# Patient Record
Sex: FEMALE | Race: WHITE | NOT HISPANIC OR LATINO | URBAN - METROPOLITAN AREA
[De-identification: names, ages, dates, MRNs, and addresses within clinical notes are randomized per-mention and may not be internally consistent; named-entity substitution may affect disease eponyms.]

---

## 2017-08-10 ENCOUNTER — IMPORTED ENCOUNTER (OUTPATIENT)
Dept: URBAN - METROPOLITAN AREA CLINIC 50 | Facility: CLINIC | Age: 82
End: 2017-08-10

## 2017-11-20 ENCOUNTER — IMPORTED ENCOUNTER (OUTPATIENT)
Dept: URBAN - METROPOLITAN AREA CLINIC 50 | Facility: CLINIC | Age: 82
End: 2017-11-20

## 2019-05-30 ENCOUNTER — IMPORTED ENCOUNTER (OUTPATIENT)
Dept: URBAN - METROPOLITAN AREA CLINIC 50 | Facility: CLINIC | Age: 84
End: 2019-05-30

## 2019-09-05 ENCOUNTER — IMPORTED ENCOUNTER (OUTPATIENT)
Dept: URBAN - METROPOLITAN AREA CLINIC 50 | Facility: CLINIC | Age: 84
End: 2019-09-05

## 2020-02-13 NOTE — PATIENT DISCUSSION
"""S/P IOL OD: Tecnis ZCB00 17.0 ([facility]A) (Target: [place]) +ORA/Femto/Arcs +Omidria. Continue post operative instructions and drops per schedule.  """

## 2020-02-17 NOTE — PATIENT DISCUSSION
"""Phaco with IOL OS: 02/27/2020 Tecnis ZCB00 18.5 vs 19.0 (ORA) Target: [facility] [place]

## 2020-02-17 NOTE — PATIENT DISCUSSION
"""S/P IOL OD: Tecnis ZCB00 17.0 ([facility]A) (Target: [place]) +ORA/Femto/Arcs +Omidria. Continue post operative instructions and drops per schedule.  """

## 2020-02-27 NOTE — PATIENT DISCUSSION
"""S/P IOL OS: Tecnis ZCB00 19.0 ([facility]) (Target: [place]) +ORA/Femto/Arcs +Omidria. Continue post operative instructions and drops per schedule.  """

## 2020-03-02 NOTE — PATIENT DISCUSSION
"""S/P IOL OS: Tecnis ZCB00 19.0 (ORA) (Target: [place]) +ORA/Femto/Arcs +Omidria. Continue post operative instructions and drops per schedule.  """

## 2021-04-08 ENCOUNTER — IMPORTED ENCOUNTER (OUTPATIENT)
Dept: URBAN - METROPOLITAN AREA CLINIC 50 | Facility: CLINIC | Age: 86
End: 2021-04-08

## 2021-04-18 ASSESSMENT — VISUAL ACUITY
OS_PH: 20/30-
OS_SC: 20/30-
OD_CC: 20/20
OD_SC: 20/30
OD_OTHER: 20/25-. 20/30-.
OS_CC: 20/20
OS_OTHER: 20/30. 20/50-.
OS_PH: 20/30-
OS_CC: J1NEAR
OD_BAT: 20/25-
OS_CC: J1-@ 14 IN
OD_OTHER: 20/50. 20/60.
OS_BAT: 20/30
OD_BAT: 20/50
OS_SC: 20/70-2
OD_BAT: 20/30
OS_SC: 20/40+2
OS_SC: 20/60
OD_SC: 20/25+
OD_OTHER: 20/30. 20/40.
OD_SC: 20/25-
OS_CC: J1
OD_CC: J1-@ 14 IN
OS_BAT: 20/30
OS_OTHER: 20/30. 20/40.
OD_PH: 20/25-
OD_CC: J1NEAR
OD_CC: J1
OS_OTHER: 20/30. 20/40.
OS_BAT: 20/30
OD_SC: 20/50-1

## 2021-04-18 ASSESSMENT — TONOMETRY
OS_IOP_MMHG: 13
OD_IOP_MMHG: 13
OS_IOP_MMHG: 12
OD_IOP_MMHG: 15
OD_IOP_MMHG: 14
OS_IOP_MMHG: 13
OD_IOP_MMHG: 12
OD_IOP_MMHG: 16
OS_IOP_MMHG: 15
OS_IOP_MMHG: 16

## 2021-08-13 ENCOUNTER — PREPPED CHART (OUTPATIENT)
Dept: URBAN - METROPOLITAN AREA CLINIC 52 | Facility: CLINIC | Age: 86
End: 2021-08-13

## 2021-08-16 ENCOUNTER — PROBLEM (OUTPATIENT)
Dept: URBAN - METROPOLITAN AREA CLINIC 52 | Facility: CLINIC | Age: 86
End: 2021-08-16

## 2021-08-16 DIAGNOSIS — H02.055: ICD-10-CM

## 2021-08-16 DIAGNOSIS — H00.12: ICD-10-CM

## 2021-08-16 PROCEDURE — 67820 REVISE EYELASHES: CPT

## 2021-08-16 PROCEDURE — 92012 INTRM OPH EXAM EST PATIENT: CPT

## 2021-08-16 RX ORDER — TOBRAMYCIN AND DEXAMETHASONE 1; 3 MG/ML; MG/ML
1 SUSPENSION/ DROPS OPHTHALMIC
Start: 2021-08-16 | End: 2021-08-23

## 2021-08-16 ASSESSMENT — VISUAL ACUITY
OS_SC: 20/70-1
OD_PH: 20/25-2
OS_PH: 20/30-1
OD_SC: 20/40-2
OU_SC: 20/30-2

## 2021-08-16 ASSESSMENT — TONOMETRY
OS_IOP_MMHG: 15
OD_IOP_MMHG: 15

## 2021-08-16 NOTE — PATIENT DISCUSSION
resolving,  recommend tobradex 3 times a day for 3 days.  then stop.  keep annaul appts with Dr Art Robertson as scheduled.

## 2021-08-16 NOTE — PROCEDURE NOTE: CLINICAL
PROCEDURE NOTE: Epilation Left Lower Lid. Diagnosis: Trichiasis without Entropion. Consent was obtained prior to the procedure. Patient was brought to slit lamp where trichiasis was/were removed using micro forceps. Patient tolerated procedure well. Xiang Love

## 2021-09-10 ENCOUNTER — PROBLEM (OUTPATIENT)
Dept: URBAN - METROPOLITAN AREA CLINIC 50 | Facility: CLINIC | Age: 86
End: 2021-09-10

## 2021-09-10 DIAGNOSIS — H02.055: ICD-10-CM

## 2021-09-10 PROCEDURE — 92012 INTRM OPH EXAM EST PATIENT: CPT

## 2021-09-10 PROCEDURE — 67820 REVISE EYELASHES: CPT

## 2021-09-10 ASSESSMENT — VISUAL ACUITY
OD_SC: 20/30
OS_SC: 20/40-1

## 2021-09-10 ASSESSMENT — TONOMETRY
OD_IOP_MMHG: 12
OS_IOP_MMHG: 12

## 2021-09-10 NOTE — PROCEDURE NOTE: CLINICAL
PROCEDURE NOTE: Epilation Left Lower Lid. Diagnosis: Trichiasis without Entropion. Consent was obtained prior to the procedure. Patient was brought to slit lamp where trichiasis was/were removed using micro forceps. Patient tolerated procedure well. Luz Martinez

## 2021-09-10 NOTE — PATIENT DISCUSSION
Epilation of Eyelashes LLL. Consent signed. Will see patient back in 3 months to check for trichiasis.

## 2021-09-10 NOTE — PATIENT DISCUSSION
resolving,  recommend tobradex 3 times a day for 3 days.  then stop.  keep annaul appts with Dr Jeaneth Chavez as scheduled.

## 2022-01-06 ENCOUNTER — COMPREHENSIVE EXAM (OUTPATIENT)
Dept: URBAN - METROPOLITAN AREA CLINIC 52 | Facility: CLINIC | Age: 87
End: 2022-01-06

## 2022-01-06 DIAGNOSIS — H35.3131: ICD-10-CM

## 2022-01-06 DIAGNOSIS — H43.813: ICD-10-CM

## 2022-01-06 PROCEDURE — 92014 COMPRE OPH EXAM EST PT 1/>: CPT

## 2022-01-06 PROCEDURE — 92134 CPTRZ OPH DX IMG PST SGM RTA: CPT

## 2022-01-06 ASSESSMENT — TONOMETRY
OS_IOP_MMHG: 15
OD_IOP_MMHG: 15

## 2022-01-06 ASSESSMENT — VISUAL ACUITY
OD_PH: 20/25
OS_GLARE: 20/20-1
OS_PH: 20/25
OD_GLARE: 20/20
OU_SC: 20/30
OD_SC: 20/40
OS_GLARE: 20/30
OU_SC: J1
OS_SC: 20/60
OD_GLARE: 20/30

## 2022-04-22 NOTE — PATIENT DISCUSSION
resolving,  recommend tobradex 3 times a day for 3 days.  then stop.  keep annaul appts with Dr Yana Reina as scheduled. no no no no

## 2023-04-04 ENCOUNTER — COMPREHENSIVE EXAM (OUTPATIENT)
Dept: URBAN - METROPOLITAN AREA CLINIC 52 | Facility: CLINIC | Age: 88
End: 2023-04-04

## 2023-04-04 DIAGNOSIS — H04.123: ICD-10-CM

## 2023-04-04 DIAGNOSIS — H35.373: ICD-10-CM

## 2023-04-04 DIAGNOSIS — H43.813: ICD-10-CM

## 2023-04-04 PROCEDURE — 92134 CPTRZ OPH DX IMG PST SGM RTA: CPT

## 2023-04-04 PROCEDURE — 92014 COMPRE OPH EXAM EST PT 1/>: CPT

## 2023-04-04 PROCEDURE — 92015 DETERMINE REFRACTIVE STATE: CPT

## 2023-04-04 ASSESSMENT — VISUAL ACUITY
OU_SC: J1
OS_SC: 20/30
OD_SC: 20/40
OU_SC: 20/30

## 2023-04-04 ASSESSMENT — TONOMETRY
OS_IOP_MMHG: 12
OD_IOP_MMHG: 12

## 2024-06-04 ENCOUNTER — COMPREHENSIVE EXAM (OUTPATIENT)
Dept: URBAN - METROPOLITAN AREA CLINIC 52 | Facility: CLINIC | Age: 89
End: 2024-06-04

## 2024-06-04 DIAGNOSIS — H35.373: ICD-10-CM

## 2024-06-04 DIAGNOSIS — H43.813: ICD-10-CM

## 2024-06-04 DIAGNOSIS — H04.123: ICD-10-CM

## 2024-06-04 PROCEDURE — 99214 OFFICE O/P EST MOD 30 MIN: CPT

## 2024-06-04 PROCEDURE — 92134 CPTRZ OPH DX IMG PST SGM RTA: CPT

## 2024-06-04 ASSESSMENT — KERATOMETRY
OD_AXISANGLE2_DEGREES: 14
OD_K1POWER_DIOPTERS: 42.50
OS_K2POWER_DIOPTERS: 44.50
OS_AXISANGLE_DEGREES: 75
OS_K1POWER_DIOPTERS: 42.00
OD_AXISANGLE_DEGREES: 104
OS_AXISANGLE2_DEGREES: 165
OD_K2POWER_DIOPTERS: 45.00

## 2024-06-04 ASSESSMENT — VISUAL ACUITY
OD_PH: 20/20-2
OS_GLARE: 20/25
OD_GLARE: 20/20-1
OD_GLARE: 20/20
OS_GLARE: 20/30-1
OU_SC: J1@16
OD_SC: 20/40-1
OS_PH: 20/25-1
OS_SC: 20/50-1

## 2024-06-04 ASSESSMENT — TONOMETRY
OD_IOP_MMHG: 15
OS_IOP_MMHG: 15

## 2025-06-16 ENCOUNTER — COMPREHENSIVE EXAM (OUTPATIENT)
Age: 89
End: 2025-06-16

## 2025-06-16 DIAGNOSIS — H40.023: ICD-10-CM

## 2025-06-16 DIAGNOSIS — H43.813: ICD-10-CM

## 2025-06-16 DIAGNOSIS — H52.4: ICD-10-CM

## 2025-06-16 DIAGNOSIS — H04.123: ICD-10-CM

## 2025-06-16 DIAGNOSIS — H35.033: ICD-10-CM

## 2025-06-16 PROCEDURE — 92015 DETERMINE REFRACTIVE STATE: CPT

## 2025-06-16 PROCEDURE — 92134 CPTRZ OPH DX IMG PST SGM RTA: CPT

## 2025-06-16 PROCEDURE — 99214 OFFICE O/P EST MOD 30 MIN: CPT

## 2025-07-28 ENCOUNTER — DIAGNOSTICS ONLY (OUTPATIENT)
Age: 89
End: 2025-07-28

## 2025-07-28 DIAGNOSIS — H40.023: ICD-10-CM

## 2025-07-28 PROCEDURE — 92133 CPTRZD OPH DX IMG PST SGM ON: CPT

## 2025-07-28 PROCEDURE — 92083 EXTENDED VISUAL FIELD XM: CPT

## 2025-08-01 ENCOUNTER — CONTACT LENSES/GLASSES VISIT (OUTPATIENT)
Age: 89
End: 2025-08-01

## 2025-08-01 DIAGNOSIS — Z97.3: ICD-10-CM

## 2025-08-01 DIAGNOSIS — H52.4: ICD-10-CM

## 2025-08-01 PROCEDURE — 92310-4 LEVEL 4 NEW SOFT LENS

## 2025-08-12 ENCOUNTER — CONTACT LENSES/GLASSES VISIT (OUTPATIENT)
Age: 89
End: 2025-08-12

## 2025-08-12 DIAGNOSIS — H52.4: ICD-10-CM

## 2025-08-12 PROCEDURE — 92310F
